# Patient Record
Sex: MALE | Race: ASIAN | URBAN - METROPOLITAN AREA
[De-identification: names, ages, dates, MRNs, and addresses within clinical notes are randomized per-mention and may not be internally consistent; named-entity substitution may affect disease eponyms.]

---

## 2019-01-23 ENCOUNTER — TRANSFERRED RECORDS (OUTPATIENT)
Dept: HEALTH INFORMATION MANAGEMENT | Facility: CLINIC | Age: 29
End: 2019-01-23

## 2019-01-23 ENCOUNTER — MEDICAL CORRESPONDENCE (OUTPATIENT)
Dept: HEALTH INFORMATION MANAGEMENT | Facility: CLINIC | Age: 29
End: 2019-01-23

## 2019-01-28 ENCOUNTER — TRANSFERRED RECORDS (OUTPATIENT)
Dept: HEALTH INFORMATION MANAGEMENT | Facility: CLINIC | Age: 29
End: 2019-01-28

## 2019-01-30 ENCOUNTER — TELEPHONE (OUTPATIENT)
Dept: DERMATOLOGY | Facility: CLINIC | Age: 29
End: 2019-01-30

## 2019-01-30 NOTE — TELEPHONE ENCOUNTER
Called José Miguel to remind her of her appointment on 2/6/19. Informed them of parking and to arrive 15 minutes early.   REKHA Lake

## 2019-02-06 ENCOUNTER — OFFICE VISIT (OUTPATIENT)
Dept: DERMATOLOGY | Facility: CLINIC | Age: 29
End: 2019-02-06
Payer: COMMERCIAL

## 2019-02-06 DIAGNOSIS — L05.91 PILONIDAL CYST WITHOUT INFECTION: Primary | ICD-10-CM

## 2019-02-06 ASSESSMENT — PAIN SCALES - GENERAL: PAINLEVEL: NO PAIN (0)

## 2019-02-06 NOTE — PROGRESS NOTES
[facility] Dermatology Note      Dermatology Problem List:  1. Cyst, left upper gluteal cleft  - referral to dermatology surgery    Encounter Date: Feb 6, 2019    CC:  Chief Complaint   Patient presents with     Derm Problem     José Miguel is here today to have a cyst near his tail bone looked at.      History of Present Illness:  Mr. José Miguel Grayson is a 28 year old male who presents today in referral from Dr. Johana Martinez for a cyst evaluation as a new patient. Today she reports a cyst near his tail bone. He had an infundibular cyst near his tailbone removed on 1/3/19 at [facility]. By the 3rd week in January the lesion had returned- in a similar size and location. He then returned to [place] regarding this lesion, which was then expressed- as it was flaring at this visit. This lesion is occasionally bothersome to him, especially when it is tender and draining.     Denies chronic fever, night sweats and/or unexplained weight loss. Otherwise he is feeling well, without additional skin concerns.     Past Medical History:   There is no problem list on file for this patient.    History reviewed. No pertinent past medical history.  History reviewed. No pertinent surgical history.    Social History:  Patient reports that he has been smoking.  he has never used smokeless tobacco.   He is in graduate school.     Family History:  History reviewed. No pertinent family history.    Medications:  No current outpatient medications on file.       No Known Allergies    Review of Systems:  -Constitutional: Otherwise feeling well today, in usual state of health.  -Skin: As above in HPI. No additional skin concerns.  -Denies any rectal pain or painful bowel movements.     Physical exam:  Vitals: There were no vitals taken for this visit.  GEN: This is a well developed, well-nourished male in no acute distress, in a pleasant mood.    SKIN: Focused examination of the lower back was performed.  -There is a  raised dome shaped 2 cm x 1.5 cm nodule on the left upper gluteal cleft  -No other lesions of concern on areas examined.       Impression/Plan:  1. Cyst, left upper gluteal cleft     Cyst management options were reviewed. The patient elects for excision due to clinical symptoms. Patient recommended scheduling in dermatology surgery.     Photodocumentation obtained today.     Follow-up per dermatology surgery    Staff Involved:  Scribe/Staff    Scribe Disclosure:   Negrita CAMPOS, am serving as a scribe to document services personally performed by Vanessa Freed PA-C, based on data collection and the provider's statements to me.    Provider Disclosure:   The documentation recorded by the scribe accurately reflects the services I personally performed and the decisions made by me.    All risks, benefits and alternatives were discussed with patient.  Patient is in agreement and understands the assessment and plan.  All questions were answered.  Sun Screen Education was given.   Return to Clinic as needed  Vanessa Freed PA-C   [facility]

## 2019-02-06 NOTE — NURSING NOTE
Dermatology Rooming Note    José Miguel Grayson's goals for this visit include:   Chief Complaint   Patient presents with     Derm Problem     José Miguel is here today to have a cyst near his tail bone looked at.      REKHA Lake

## 2019-02-06 NOTE — LETTER
2/6/2019       RE: José Miguel Grayson  [address]  [facility] [id number]     Dear Colleague,    Thank you for referring your patient, José Miguel Grayson, to the [facility] DERMATOLOGY at [facility]. Please see a copy of my visit note below.    [facility] Dermatology Note      Dermatology Problem List:  1. Cyst, left upper gluteal cleft  - referral to dermatology surgery    Encounter Date: Feb 6, 2019    CC:  Chief Complaint   Patient presents with     Derm Problem     José Miguel is here today to have a cyst near his tail bone looked at.      History of Present Illness:  Mr. José Miguel Grayson is a 28 year old male who presents today in referral from Dr. Johana Martinez for a cyst evaluation as a new patient. Today she reports a cyst near his tail bone. He had an infundibular cyst near his tailbone removed on 1/3/19 at [facility]. By the 3rd week in January the lesion had returned- in a similar size and location. He then returned to [place] regarding this lesion, which was then expressed- as it was flaring at this visit. This lesion is occasionally bothersome to him, especially when it is tender and draining.     Denies chronic fever, night sweats and/or unexplained weight loss. Otherwise he is feeling well, without additional skin concerns.     Past Medical History:   There is no problem list on file for this patient.    History reviewed. No pertinent past medical history.  History reviewed. No pertinent surgical history.    Social History:  Patient reports that he has been smoking.  he has never used smokeless tobacco.   He is in graduate school.     Family History:  History reviewed. No pertinent family history.    Medications:  No current outpatient medications on file.       No Known Allergies    Review of Systems:  -Constitutional: Otherwise feeling well today, in usual state of health.  -Skin: As above in HPI. No additional skin  concerns.  -Denies any rectal pain or painful bowel movements.     Physical exam:  Vitals: There were no vitals taken for this visit.  GEN: This is a well developed, well-nourished male in no acute distress, in a pleasant mood.    SKIN: Focused examination of the lower back was performed.  -There is a raised dome shaped 2 cm x 1.5 cm nodule on the left upper gluteal cleft  -No other lesions of concern on areas examined.       Impression/Plan:  1. Cyst, left upper gluteal cleft     Cyst management options were reviewed. The patient elects for excision due to clinical symptoms. Patient recommended scheduling in dermatology surgery.     Photodocumentation obtained today.     Follow-up per dermatology surgery    Staff Involved:  Scribe/Staff    Scribe Disclosure:   BETH, Negrita Auguste, am serving as a scribe to document services personally performed by Vanessa Freed PA-C, based on data collection and the provider's statements to me.    Provider Disclosure:   The documentation recorded by the scribe accurately reflects the services I personally performed and the decisions made by me.    All risks, benefits and alternatives were discussed with patient.  Patient is in agreement and understands the assessment and plan.  All questions were answered.  Sun Screen Education was given.   Return to Clinic as needed    Vanessa Freed PA-C   [facility]         Pictures were placed in Pt's chart today for future reference.

## 2019-02-11 ENCOUNTER — OFFICE VISIT (OUTPATIENT)
Dept: DERMATOLOGY | Facility: CLINIC | Age: 29
End: 2019-02-11
Payer: COMMERCIAL

## 2019-02-11 DIAGNOSIS — L72.9 CYST OF SKIN: Primary | ICD-10-CM

## 2019-02-11 RX ORDER — MINOCYCLINE HYDROCHLORIDE 100 MG/1
100 CAPSULE ORAL 2 TIMES DAILY
Qty: 180 CAPSULE | Refills: 1 | Status: SHIPPED | OUTPATIENT
Start: 2019-02-11 | End: 2019-05-12

## 2019-02-11 ASSESSMENT — PAIN SCALES - GENERAL: PAINLEVEL: NO PAIN (0)

## 2019-02-11 NOTE — PROGRESS NOTES
[facility] Dermatology Note    Dermatology Surgery Clinic  [facility]  [address] [place] [id number]    Dermatology Problem List:  1. Cyst, left upper gluteal cleft  - referral to dermatology surgery    Encounter Date: Feb 11, 2019    CC:  Chief Complaint   Patient presents with     Consult For     consult excision tailbone cyst       History of Present Illness:  Mr. José Miguel Grayson is a 28 year old male who presents today for an excision consultation regarding a cyst on his left buttock. This lesion was initially reviewed by Vanessa Freed PA-C on 02/06/2019 and was then referred to our derm surg department. Today the patient reports that the cyst initially emerged in late November 2018 and was very painful. Since then he had the cyst excised on 1/3/19 at [facility]. By the 3rd week in January the lesion had returned- in a similar size and location.  He would like the site reevaluated and reexcised, if possible. Patient is otherwise feeling well. There are no other skin concerns at this time.      Past Medical History:   There is no problem list on file for this patient.    History reviewed. No pertinent past medical history.  History reviewed. No pertinent surgical history.    Social History:  Social History     Socioeconomic History     Marital status: Single     Spouse name: None     Number of children: None     Years of education: None     Highest education level: None   Social Needs     Financial resource strain: None     Food insecurity - worry: None     Food insecurity - inability: None     Transportation needs - medical: None     Transportation needs - non-medical: None   Occupational History     None   Tobacco Use     Smoking status: Light Tobacco Smoker     Smokeless tobacco: Never Used   Substance and Sexual Activity     Alcohol use: None     Drug use: None     Sexual activity: None   Other Topics Concern      Parent/sibling w/ CABG, MI or angioplasty before 65F 55M? Not Asked   Social History Narrative     None       Family History:  History reviewed. No pertinent family history.     Medications:  Current Outpatient Medications   Medication Sig Dispense Refill     minocycline (MINOCIN/DYNACIN) 100 MG capsule Take 1 capsule (100 mg) by mouth 2 times daily 180 capsule 1       No Known Allergies    Review of Systems:  -Skin Establ Pt: The patient denies any new rash, pruritus, or lesions that are symptomatic, changing or bleeding, except as per HPI.  -Constitutional: The patient is feeling generally well.    Physical exam:  Vitals: There were no vitals taken for this visit.  GEN: This is a well developed, well-nourished male in no acute distress, in a pleasant mood.    SKIN: Focused examination of the buttocks was performed.  - 1.5 cystic friable papule of the left superior medial buttock.  - No other lesions of concern on areas examined.       Impression/Plan:  1. Cyst, L upper gluteal cleft INflamed EIC vs. Pilonidal cyst vs. HS. Patient to take minocycline for 1 month, and report back if still symptomatic.    Discussed benign nature of the cyst with patient.     Patient was prescribed minocycline today, 100mg BID for 1 month. Patient to report back in 1 month and if lesion is still symptomatic surgical options can be pursued.  Counseled that excision with granulation is preferable for HS family diseases.      Follow-up as scheduled for [facility].       Staff Involved:    Scribe Disclosure  I, Chucho Esposito, am serving as a scribe to document services personally performed by Dr. Juan Pablo Diehl, based on data collection and the provider's statements to me.     Attending Attestation  I attest that the Scribe recorded the interview and exam that I personally performed.  I have reviewed the note and edited it as necessary.    Juan Pablo Diehl M.D.    Director of Dermatologic Surgery  Department of  Dermatology  [facility]

## 2019-02-11 NOTE — LETTER
2/11/2019       RE: José Miguel Grayson  [address]  [facility] [address]     Dear Colleague,    Thank you for referring your patient, José Miguel Grayson, to the [facility] DERMATOLOGIC SURGERY at [facility]. Please see a copy of my visit note below.    [facility] Dermatology Note    Dermatology Surgery Clinic  [facility]  Clinics and Surgery Center  [address] [place] [address]    Dermatology Problem List:  1. Cyst, left upper gluteal cleft  - referral to dermatology surgery    Encounter Date: Feb 11, 2019    CC:  Chief Complaint   Patient presents with     Consult For     consult excision tailbone cyst       History of Present Illness:  Mr. José Miguel Grayson is a 28 year old male who presents today for an excision consultation regarding a cyst on his left buttock. This lesion was initially reviewed by Vanessa Freed PA-C on 02/06/2019 and was then referred to our derm surg department. Today the patient reports that the cyst initially emerged in late November 2018 and was very painful. Since then he had the cyst excised on 1/3/19 at [facility]. By the 3rd week in January the lesion had returned- in a similar size and location.  He would like the site reevaluated and reexcised, if possible. Patient is otherwise feeling well. There are no other skin concerns at this time.      Past Medical History:   There is no problem list on file for this patient.    History reviewed. No pertinent past medical history.  History reviewed. No pertinent surgical history.    Social History:  Social History     Socioeconomic History     Marital status: Single     Spouse name: None     Number of children: None     Years of education: None     Highest education level: None   Social Needs     Financial resource strain: None     Food insecurity - worry: None     Food insecurity - inability: None     Transportation needs - medical:  None     Transportation needs - non-medical: None   Occupational History     None   Tobacco Use     Smoking status: Light Tobacco Smoker     Smokeless tobacco: Never Used   Substance and Sexual Activity     Alcohol use: None     Drug use: None     Sexual activity: None   Other Topics Concern     Parent/sibling w/ CABG, MI or angioplasty before 65F 55M? Not Asked   Social History Narrative     None       Family History:  History reviewed. No pertinent family history.     Medications:  Current Outpatient Medications   Medication Sig Dispense Refill     minocycline (MINOCIN/DYNACIN) 100 MG capsule Take 1 capsule (100 mg) by mouth 2 times daily 180 capsule 1     No Known Allergies    Review of Systems:  -Skin Establ Pt: The patient denies any new rash, pruritus, or lesions that are symptomatic, changing or bleeding, except as per HPI.  -Constitutional: The patient is feeling generally well.    Physical exam:  Vitals: There were no vitals taken for this visit.  GEN: This is a well developed, well-nourished male in no acute distress, in a pleasant mood.    SKIN: Focused examination of the buttocks was performed.  - 1.5 cystic friable papule of the left superior medial buttock.  - No other lesions of concern on areas examined.     Impression/Plan:  1. Cyst, L upper gluteal cleft INflamed EIC vs. Pilonidal cyst vs. HS. Patient to take minocycline for 1 month, and report back if still symptomatic.    Discussed benign nature of the cyst with patient.     Patient was prescribed minocycline today, 100mg BID for 1 month. Patient to report back in 1 month and if lesion is still symptomatic surgical options can be pursued.  Counseled that excision with granulation is preferable for HS family diseases.    Follow-up as scheduled for [facility].     Staff Involved:    Scribe Disclosure  I, Chucho Esposito, am serving as a scribe to document services personally performed by Dr. Juan Pablo Diehl, based on data collection and the provider's  statements to me.     Attending Attestation  I attest that the Scribe recorded the interview and exam that I personally performed.  I have reviewed the note and edited it as necessary.    Juan Pablo Diehl M.D.    Director of Dermatologic Surgery  Department of Dermatology  [facility]

## 2020-03-11 ENCOUNTER — HEALTH MAINTENANCE LETTER (OUTPATIENT)
Age: 30
End: 2020-03-11

## 2021-01-03 ENCOUNTER — HEALTH MAINTENANCE LETTER (OUTPATIENT)
Age: 31
End: 2021-01-03

## 2021-04-25 ENCOUNTER — HEALTH MAINTENANCE LETTER (OUTPATIENT)
Age: 31
End: 2021-04-25

## 2021-10-10 ENCOUNTER — HEALTH MAINTENANCE LETTER (OUTPATIENT)
Age: 31
End: 2021-10-10

## 2022-05-21 ENCOUNTER — HEALTH MAINTENANCE LETTER (OUTPATIENT)
Age: 32
End: 2022-05-21

## 2022-09-18 ENCOUNTER — HEALTH MAINTENANCE LETTER (OUTPATIENT)
Age: 32
End: 2022-09-18

## 2023-06-04 ENCOUNTER — HEALTH MAINTENANCE LETTER (OUTPATIENT)
Age: 33
End: 2023-06-04

## 2024-04-14 NOTE — PROGRESS NOTES
Pictures were placed in Pt's chart today for future reference.     Pt to discharge today per team.  HC set up. SOC 24-48 hrs. Team aware.